# Patient Record
Sex: MALE | Race: WHITE | NOT HISPANIC OR LATINO | Employment: UNEMPLOYED | URBAN - METROPOLITAN AREA
[De-identification: names, ages, dates, MRNs, and addresses within clinical notes are randomized per-mention and may not be internally consistent; named-entity substitution may affect disease eponyms.]

---
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
## 2022-03-22 ENCOUNTER — HISTORICAL (OUTPATIENT)
Dept: ADMINISTRATIVE | Facility: HOSPITAL | Age: 22
End: 2022-03-22

## 2022-10-19 ENCOUNTER — HOSPITAL ENCOUNTER (EMERGENCY)
Facility: HOSPITAL | Age: 22
Discharge: HOME | End: 2022-10-19
Attending: INTERNAL MEDICINE
Payer: MEDICAID

## 2022-10-19 VITALS
DIASTOLIC BLOOD PRESSURE: 80 MMHG | HEIGHT: 67 IN | SYSTOLIC BLOOD PRESSURE: 116 MMHG | BODY MASS INDEX: 23.54 KG/M2 | OXYGEN SATURATION: 96 % | WEIGHT: 150 LBS | RESPIRATION RATE: 17 BRPM | HEART RATE: 107 BPM

## 2022-10-19 DIAGNOSIS — F32.A DEPRESSION, UNSPECIFIED DEPRESSION TYPE: Primary | ICD-10-CM

## 2022-10-19 LAB
ALBUMIN SERPL-MCNC: 4.4 GM/DL (ref 3.5–5)
ALBUMIN/GLOB SERPL: 1.5 RATIO (ref 1.1–2)
ALP SERPL-CCNC: 92 UNIT/L (ref 40–150)
ALT SERPL-CCNC: 113 UNIT/L (ref 0–55)
AMPHET UR QL SCN: NEGATIVE
APAP SERPL-MCNC: <17.4 UG/ML (ref 17.4–30)
APPEARANCE UR: CLEAR
AST SERPL-CCNC: 47 UNIT/L (ref 5–34)
BACTERIA #/AREA URNS AUTO: ABNORMAL /HPF
BARBITURATE SCN PRESENT UR: NEGATIVE
BASOPHILS # BLD AUTO: 0.07 X10(3)/MCL (ref 0–0.2)
BASOPHILS NFR BLD AUTO: 0.4 %
BENZODIAZ UR QL SCN: NEGATIVE
BILIRUB UR QL STRIP.AUTO: NEGATIVE MG/DL
BILIRUBIN DIRECT+TOT PNL SERPL-MCNC: 1.1 MG/DL
BUN SERPL-MCNC: 9 MG/DL (ref 8.9–20.6)
CALCIUM SERPL-MCNC: 9.7 MG/DL (ref 8.4–10.2)
CANNABINOIDS UR QL SCN: POSITIVE
CAOX CRY URNS QL MICRO: ABNORMAL /HPF
CHLORIDE SERPL-SCNC: 105 MMOL/L (ref 98–107)
CO2 SERPL-SCNC: 21 MMOL/L (ref 22–29)
COCAINE UR QL SCN: NEGATIVE
COLOR UR AUTO: YELLOW
CREAT SERPL-MCNC: 0.92 MG/DL (ref 0.73–1.18)
EOSINOPHIL # BLD AUTO: 0.12 X10(3)/MCL (ref 0–0.9)
EOSINOPHIL NFR BLD AUTO: 0.8 %
ERYTHROCYTE [DISTWIDTH] IN BLOOD BY AUTOMATED COUNT: 11.9 % (ref 11.5–17)
ETHANOL SERPL-MCNC: <10 MG/DL
FENTANYL UR QL SCN: NEGATIVE
GFR SERPLBLD CREATININE-BSD FMLA CKD-EPI: >60 MLS/MIN/1.73/M2
GLOBULIN SER-MCNC: 2.9 GM/DL (ref 2.4–3.5)
GLUCOSE SERPL-MCNC: 128 MG/DL (ref 74–100)
GLUCOSE UR QL STRIP.AUTO: NEGATIVE MG/DL
HCT VFR BLD AUTO: 47.5 % (ref 42–52)
HGB BLD-MCNC: 16.4 GM/DL (ref 14–18)
IMM GRANULOCYTES # BLD AUTO: 0.03 X10(3)/MCL (ref 0–0.04)
IMM GRANULOCYTES NFR BLD AUTO: 0.2 %
KETONES UR QL STRIP.AUTO: NEGATIVE MG/DL
LEUKOCYTE ESTERASE UR QL STRIP.AUTO: NEGATIVE UNIT/L
LYMPHOCYTES # BLD AUTO: 4.85 X10(3)/MCL (ref 0.6–4.6)
LYMPHOCYTES NFR BLD AUTO: 31.1 %
MCH RBC QN AUTO: 31.2 PG (ref 27–31)
MCHC RBC AUTO-ENTMCNC: 34.5 MG/DL (ref 33–36)
MCV RBC AUTO: 90.3 FL (ref 80–94)
MDMA UR QL SCN: NEGATIVE
MONOCYTES # BLD AUTO: 1.35 X10(3)/MCL (ref 0.1–1.3)
MONOCYTES NFR BLD AUTO: 8.7 %
MUCOUS THREADS URNS QL MICRO: ABNORMAL /LPF
NEUTROPHILS # BLD AUTO: 9.2 X10(3)/MCL (ref 2.1–9.2)
NEUTROPHILS NFR BLD AUTO: 58.8 %
NITRITE UR QL STRIP.AUTO: NEGATIVE
OPIATES UR QL SCN: NEGATIVE
PCP UR QL: NEGATIVE
PH UR STRIP.AUTO: 5.5 [PH]
PH UR: 5.5 [PH] (ref 3–11)
PLATELET # BLD AUTO: 346 X10(3)/MCL (ref 130–400)
PMV BLD AUTO: 9.3 FL (ref 7.4–10.4)
POTASSIUM SERPL-SCNC: 3.9 MMOL/L (ref 3.5–5.1)
PROT SERPL-MCNC: 7.3 GM/DL (ref 6.4–8.3)
PROT UR QL STRIP.AUTO: ABNORMAL MG/DL
RBC # BLD AUTO: 5.26 X10(6)/MCL (ref 4.7–6.1)
RBC #/AREA URNS AUTO: ABNORMAL /HPF
RBC UR QL AUTO: ABNORMAL UNIT/L
SALICYLATES SERPL-MCNC: <5 MG/DL
SARS-COV-2 RDRP RESP QL NAA+PROBE: NEGATIVE
SODIUM SERPL-SCNC: 139 MMOL/L (ref 136–145)
SP GR UR STRIP.AUTO: >=1.03
SPECIFIC GRAVITY, URINE AUTO (.000) (OHS): >=1.03 (ref 1–1.03)
SQUAMOUS #/AREA URNS AUTO: ABNORMAL /HPF
TSH SERPL-ACNC: 0.89 UIU/ML (ref 0.35–4.94)
UROBILINOGEN UR STRIP-ACNC: 0.2 MG/DL
WBC # SPEC AUTO: 15.6 X10(3)/MCL (ref 4.5–11.5)
WBC #/AREA URNS AUTO: ABNORMAL /HPF

## 2022-10-19 PROCEDURE — 85025 COMPLETE CBC W/AUTO DIFF WBC: CPT | Performed by: INTERNAL MEDICINE

## 2022-10-19 PROCEDURE — 82077 ASSAY SPEC XCP UR&BREATH IA: CPT | Performed by: INTERNAL MEDICINE

## 2022-10-19 PROCEDURE — 36415 COLL VENOUS BLD VENIPUNCTURE: CPT | Performed by: INTERNAL MEDICINE

## 2022-10-19 PROCEDURE — 80053 COMPREHEN METABOLIC PANEL: CPT | Performed by: INTERNAL MEDICINE

## 2022-10-19 PROCEDURE — 99283 EMERGENCY DEPT VISIT LOW MDM: CPT | Mod: 25

## 2022-10-19 PROCEDURE — 84443 ASSAY THYROID STIM HORMONE: CPT | Performed by: INTERNAL MEDICINE

## 2022-10-19 PROCEDURE — 80143 DRUG ASSAY ACETAMINOPHEN: CPT | Performed by: INTERNAL MEDICINE

## 2022-10-19 PROCEDURE — 80179 DRUG ASSAY SALICYLATE: CPT | Performed by: INTERNAL MEDICINE

## 2022-10-19 PROCEDURE — 25000003 PHARM REV CODE 250: Performed by: INTERNAL MEDICINE

## 2022-10-19 PROCEDURE — 80307 DRUG TEST PRSMV CHEM ANLYZR: CPT | Performed by: INTERNAL MEDICINE

## 2022-10-19 PROCEDURE — 81001 URINALYSIS AUTO W/SCOPE: CPT | Mod: 59 | Performed by: INTERNAL MEDICINE

## 2022-10-19 PROCEDURE — 87635 SARS-COV-2 COVID-19 AMP PRB: CPT | Performed by: INTERNAL MEDICINE

## 2022-10-19 RX ORDER — ESCITALOPRAM OXALATE 10 MG/1
10 TABLET ORAL ONCE
Status: COMPLETED | OUTPATIENT
Start: 2022-10-19 | End: 2022-10-19

## 2022-10-19 RX ADMIN — ESCITALOPRAM OXALATE 10 MG: 10 TABLET ORAL at 08:10

## 2022-10-20 NOTE — ED PROVIDER NOTES
Encounter Date: 10/19/2022       History     Chief Complaint   Patient presents with    Suicidal     SI and depression, denies plan     22-year-old white male presents emergency department with some depression.  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 Desir approximately 2 weeks ago was given a script for Lexapro however he has not had it filled yet.  He denies suicidal homicidal ideation currently and denies hallucinations of any type he states he just got depressed early began having some thoughts and he wanted to come get checked out just to make sure but currently he does not have a plan is not have any current suicidal ideation and he states he has good support at home with his mother and will be able to follow-up with counseling as an outpatient if we do not have to send him away    Review of patient's allergies indicates:  No Known Allergies  No past medical history on file.  No past surgical history on file.  No family history on file.  Social History     Tobacco Use    Smoking status: Every Day     Packs/day: 1.00     Types: Cigarettes    Smokeless tobacco: Never   Substance Use Topics    Drug use: Yes     Types: Marijuana     Review of Systems   Constitutional: Negative.  Negative for activity change, appetite change, chills, diaphoresis, fatigue, fever and unexpected weight change.   HENT: Negative.  Negative for congestion, dental problem, drooling, ear discharge, ear pain, facial swelling, hearing loss, mouth sores, nosebleeds, postnasal drip, rhinorrhea, sinus pressure, sinus pain, sneezing, sore throat, tinnitus, trouble swallowing and voice change.    Eyes: Negative.  Negative for photophobia, pain, discharge, redness, itching and visual disturbance.   Respiratory: Negative.  Negative for apnea, cough, choking, chest tightness, shortness of breath, wheezing and stridor.    Cardiovascular: Negative.  Negative for chest pain,  palpitations and leg swelling.   Gastrointestinal: Negative.  Negative for abdominal distention, abdominal pain, anal bleeding, blood in stool, constipation, diarrhea, nausea, rectal pain and vomiting.   Endocrine: Negative.  Negative for cold intolerance, heat intolerance, polydipsia, polyphagia and polyuria.   Genitourinary: Negative.  Negative for decreased urine volume, difficulty urinating, dysuria, enuresis, flank pain, frequency, genital sores, hematuria, penile discharge, penile pain, penile swelling, scrotal swelling, testicular pain and urgency.   Musculoskeletal: Negative.  Negative for arthralgias, back pain, gait problem, joint swelling, myalgias, neck pain and neck stiffness.   Skin: Negative.  Negative for color change, pallor, rash and wound.   Allergic/Immunologic: Negative.  Negative for environmental allergies, food allergies and immunocompromised state.   Neurological: Negative.  Negative for dizziness, tremors, seizures, syncope, facial asymmetry, speech difficulty, weakness, light-headedness, numbness and headaches.   Hematological: Negative.  Negative for adenopathy. Does not bruise/bleed easily.   Psychiatric/Behavioral: Negative.  Negative for agitation, behavioral problems, confusion, decreased concentration, dysphoric mood, hallucinations, self-injury, sleep disturbance and suicidal ideas. The patient is not nervous/anxious and is not hyperactive.         Depressed   All other systems reviewed and are negative.    Physical Exam     Initial Vitals [10/19/22 1829]   BP Pulse Resp Temp SpO2   (!) 141/79 (!) 130 17 -- 99 %      MAP       --         Physical Exam    Nursing note and vitals reviewed.  Constitutional: He appears well-developed and well-nourished.   HENT:   Head: Normocephalic and atraumatic.   Eyes: Conjunctivae and EOM are normal. Pupils are equal, round, and reactive to light.   Neck: Neck supple.   Normal range of motion.  Cardiovascular:  Normal rate and regular rhythm.            Pulmonary/Chest: Breath sounds normal.   Abdominal: Abdomen is soft. Bowel sounds are normal.   Musculoskeletal:         General: Normal range of motion.      Cervical back: Normal range of motion and neck supple.     Neurological: He is alert and oriented to person, place, and time.   Skin: Skin is warm and dry. Capillary refill takes less than 2 seconds.   Psychiatric: His speech is normal and behavior is normal. Judgment and thought content normal. Cognition and memory are normal. He exhibits a depressed mood.   He admits to depression but again denies suicidal homicidal ideation or hallucinations.       ED Course   Procedures  Admission on 10/19/2022   Component Date Value Ref Range Status    Sodium Level 10/19/2022 139  136 - 145 mmol/L Final    Potassium Level 10/19/2022 3.9  3.5 - 5.1 mmol/L Final    Chloride 10/19/2022 105  98 - 107 mmol/L Final    Carbon Dioxide 10/19/2022 21 (L)  22 - 29 mmol/L Final    Glucose Level 10/19/2022 128 (H)  74 - 100 mg/dL Final    Blood Urea Nitrogen 10/19/2022 9.0  8.9 - 20.6 mg/dL Final    Creatinine 10/19/2022 0.92  0.73 - 1.18 mg/dL Final    Calcium Level Total 10/19/2022 9.7  8.4 - 10.2 mg/dL Final    Protein Total 10/19/2022 7.3  6.4 - 8.3 gm/dL Final    Albumin Level 10/19/2022 4.4  3.5 - 5.0 gm/dL Final    Globulin 10/19/2022 2.9  2.4 - 3.5 gm/dL Final    Albumin/Globulin Ratio 10/19/2022 1.5  1.1 - 2.0 ratio Final    Bilirubin Total 10/19/2022 1.1  <=1.5 mg/dL Final    Alkaline Phosphatase 10/19/2022 92  40 - 150 unit/L Final    Alanine Aminotransferase 10/19/2022 113 (H)  0 - 55 unit/L Final    Aspartate Aminotransferase 10/19/2022 47 (H)  5 - 34 unit/L Final    eGFR 10/19/2022 >60  mls/min/1.73/m2 Final    Thyroid Stimulating Hormone 10/19/2022 0.8909  0.3500 - 4.9400 uIU/mL Final    Color, UA 10/19/2022 Yellow  Yellow, Light-Yellow, Dark Yellow, Juanita, Straw Final    Appearance, UA 10/19/2022 Clear  Clear Final    Specific Gravity, UA 10/19/2022 >=1.030   Final     pH, UA 10/19/2022 5.5  5.0, 5.5, 6.0, 6.5, 7.0, 7.5, 8.0, 8.5 Final    Protein, UA 10/19/2022 Trace (A)  Negative mg/dL Final    Glucose, UA 10/19/2022 Negative  Negative, Normal mg/dL Final    Ketones, UA 10/19/2022 Negative  Negative mg/dL Final    Blood, UA 10/19/2022 Moderate (A)  Negative unit/L Final    Bilirubin, UA 10/19/2022 Negative  Negative mg/dL Final    Urobilinogen, UA 10/19/2022 0.2  0.2, 1.0, Normal mg/dL Final    Nitrites, UA 10/19/2022 Negative  Negative Final    Leukocyte Esterase, UA 10/19/2022 Negative  Negative unit/L Final    Amphetamines, Urine 10/19/2022 Negative  Negative Final    Barbituates, Urine 10/19/2022 Negative  Negative Final    Benzodiazepine, Urine 10/19/2022 Negative  Negative Final    Cannabinoids, Urine 10/19/2022 Positive (A)  Negative Final    Cocaine, Urine 10/19/2022 Negative  Negative Final    Fentanyl, Urine 10/19/2022 Negative  Negative Final    MDMA, Urine 10/19/2022 Negative  Negative Final    Opiates, Urine 10/19/2022 Negative  Negative Final    Phencyclidine, Urine 10/19/2022 Negative  Negative Final    pH, Urine 10/19/2022 5.5  3.0 - 11.0 Final    Specific Gravity, Urine Auto 10/19/2022 >=1.030  1.001 - 1.035 Final    Ethanol Level 10/19/2022 <10.0  <=10.0 mg/dL Final    Acetaminophen Level 10/19/2022 <17.4 (L)  17.4 - 30.0 ug/ml Final    Salicylate Level 10/19/2022 <5.0  mg/dL Final    SARS COV-2 MOLECULAR 10/19/2022 Negative  Negative Final    WBC 10/19/2022 15.6 (H)  4.5 - 11.5 x10(3)/mcL Final    RBC 10/19/2022 5.26  4.70 - 6.10 x10(6)/mcL Final    Hgb 10/19/2022 16.4  14.0 - 18.0 gm/dL Final    Hct 10/19/2022 47.5  42.0 - 52.0 % Final    MCV 10/19/2022 90.3  80.0 - 94.0 fL Final    MCH 10/19/2022 31.2 (H)  27.0 - 31.0 pg Final    MCHC 10/19/2022 34.5  33.0 - 36.0 mg/dL Final    RDW 10/19/2022 11.9  11.5 - 17.0 % Final    Platelet 10/19/2022 346  130 - 400 x10(3)/mcL Final    MPV 10/19/2022 9.3  7.4 - 10.4 fL Final    Neut % 10/19/2022 58.8  % Final     Lymph % 10/19/2022 31.1  % Final    Mono % 10/19/2022 8.7  % Final    Eos % 10/19/2022 0.8  % Final    Basophil % 10/19/2022 0.4  % Final    Lymph # 10/19/2022 4.85 (H)  0.6 - 4.6 x10(3)/mcL Final    Neut # 10/19/2022 9.2  2.1 - 9.2 x10(3)/mcL Final    Mono # 10/19/2022 1.35 (H)  0.1 - 1.3 x10(3)/mcL Final    Eos # 10/19/2022 0.12  0 - 0.9 x10(3)/mcL Final    Baso # 10/19/2022 0.07  0 - 0.2 x10(3)/mcL Final    IG# 10/19/2022 0.03  0 - 0.04 x10(3)/mcL Final    IG% 10/19/2022 0.2  % Final    Bacteria, UA 10/19/2022 None Seen  None Seen, Rare, Occasional /HPF Final    Mucous, UA 10/19/2022 Small (A)  None Seen /LPF Final    Calcium Oxalate Crystals, UA 10/19/2022 Few (A)  None Seen /HPF Final    RBC, UA 10/19/2022 6-10 (A)  None Seen, 0-2, 3-5, 0-5, >100 /HPF Final    WBC, UA 10/19/2022 0-2  None Seen, 0-2, 3-5, 0-5 /HPF Final    Squamous Epithelial Cells, UA 10/19/2022 Rare  None Seen, Rare, Occasional, Occ /HPF Final       Labs Reviewed   COMPREHENSIVE METABOLIC PANEL - Abnormal; Notable for the following components:       Result Value    Carbon Dioxide 21 (*)     Glucose Level 128 (*)     Alanine Aminotransferase 113 (*)     Aspartate Aminotransferase 47 (*)     All other components within normal limits   URINALYSIS, REFLEX TO URINE CULTURE - Abnormal; Notable for the following components:    Protein, UA Trace (*)     Blood, UA Moderate (*)     All other components within normal limits   DRUG SCREEN, URINE (BEAKER) - Abnormal; Notable for the following components:    Cannabinoids, Urine Positive (*)     All other components within normal limits    Narrative:     Cut off concentrations:    Amphetamines - 1000 ng/ml  Barbiturates - 200 ng/ml  Benzodiazepine - 200 ng/ml  Cannabinoids (THC) - 50 ng/ml  Cocaine - 300 ng/ml  Fentanyl - 1.0 ng/ml  MDMA - 500 ng/ml  Opiates - 300 ng/ml   Phencyclidine (PCP) - 25 ng/ml    Specimen submitted for drug analysis and tested for pH and specific gravity in order to evaluate sample  integrity. Suspect tampering if specific gravity is <1.003 and/or pH is not within the range of 4.5 - 8.0  False negatives may result form substances such as bleach added to urine.  False positives may result for the presence of a substance with similar chemical structure to the drug or its metabolite.    This test provides only a PRELIMINARY analytical test result. A more specific alternate chemical method must be used in order to obtain a confirmed analytical result. Gas chromatography/mass spectrometry (GC/MS) is the preferred confirmatory method. Other chemical confirmation methods are available. Clinical consideration and professional judgement should be applied to any drug of abuse test result, particularly when preliminary positive results are used.    Positive results will be confirmed only at the physicians request. Unconfirmed screening results are to be used only for medical purposes (treatment).        ACETAMINOPHEN LEVEL - Abnormal; Notable for the following components:    Acetaminophen Level <17.4 (*)     All other components within normal limits   CBC WITH DIFFERENTIAL - Abnormal; Notable for the following components:    WBC 15.6 (*)     MCH 31.2 (*)     Lymph # 4.85 (*)     Mono # 1.35 (*)     All other components within normal limits   URINALYSIS, MICROSCOPIC - Abnormal; Notable for the following components:    Mucous, UA Small (*)     Calcium Oxalate Crystals, UA Few (*)     RBC, UA 6-10 (*)     All other components within normal limits   TSH - Normal   ALCOHOL,MEDICAL (ETHANOL) - Normal   SARS-COV-2 RNA AMPLIFICATION, QUAL - Normal    Narrative:     The IDNOW COVID-19 assay is a rapid molecular in vitro diagnostic test utilizing an isothermal nucleic acid amplification technology intended for the qualitative detection of nucleic acid from the SARS-CoV-2 viral RNA in direct nasal, nasopharyngeal or throat swabs from individuals who are suspected of COVID-19 by their healthcare provider.   CBC W/  AUTO DIFFERENTIAL    Narrative:     The following orders were created for panel order CBC auto differential.  Procedure                               Abnormality         Status                     ---------                               -----------         ------                     CBC with Differential[[id number]]        Abnormal            Final result                 Please view results for these tests on the individual orders.   SALICYLATE LEVEL          Imaging Results    None          Medications   EScitalopram oxalate tablet 10 mg (has no administration in time range)                 ED Course as of 10/19/22 2037   Wed Oct 19, 2022   2035 Discussed normal lab findings.  He gives me a picture on his phone of Lexapro that was sent to the [facility] pharmacy however that he has not picked up, therefore will give him 1 dose of oral Lexapro tonight and he states he will go to the pharmacy tomorrow in  and begin tomorrow.  I recommend he that checking the mental health tomorrow or called Dr. Desir and let him know that we are here 24/7 if he would need to send any further way [PL]      ED Course User Index  [PL] Fritz Bradley MD                 Clinical Impression:   Final diagnoses:  [F32.A] Depression, unspecified depression type (Primary)      ED Disposition Condition    Discharge Stable          ED Prescriptions    None       Follow-up Information       Follow up With Specialties Details Why Contact Info    Brandon Coppola, DO [facility] day  [address] [facility] [address] [phone]          Regina Gandara is a certified MA and was present during the entire interaction with this patient      Fritz Bradley MD  10/19/22 2037